# Patient Record
Sex: FEMALE | Race: WHITE | NOT HISPANIC OR LATINO | Employment: OTHER | RURAL
[De-identification: names, ages, dates, MRNs, and addresses within clinical notes are randomized per-mention and may not be internally consistent; named-entity substitution may affect disease eponyms.]

---

## 2022-06-21 ENCOUNTER — OFFICE VISIT (OUTPATIENT)
Dept: FAMILY MEDICINE CLINIC | Facility: CLINIC | Age: 47
End: 2022-06-21

## 2022-06-21 VITALS
OXYGEN SATURATION: 99 % | BODY MASS INDEX: 38.57 KG/M2 | HEIGHT: 66 IN | DIASTOLIC BLOOD PRESSURE: 90 MMHG | SYSTOLIC BLOOD PRESSURE: 140 MMHG | WEIGHT: 240 LBS | TEMPERATURE: 99.1 F | HEART RATE: 75 BPM

## 2022-06-21 DIAGNOSIS — M25.572 ACUTE LEFT ANKLE PAIN: ICD-10-CM

## 2022-06-21 DIAGNOSIS — L03.116 CELLULITIS OF LEFT ANKLE: Primary | ICD-10-CM

## 2022-06-21 PROCEDURE — 99213 OFFICE O/P EST LOW 20 MIN: CPT | Performed by: PHYSICIAN ASSISTANT

## 2022-06-21 RX ORDER — LAMOTRIGINE 200 MG/1
200 TABLET ORAL DAILY
COMMUNITY
Start: 2022-06-14

## 2022-06-21 RX ORDER — CEPHALEXIN 500 MG/1
500 CAPSULE ORAL 3 TIMES DAILY
Qty: 30 CAPSULE | Refills: 0 | Status: SHIPPED | OUTPATIENT
Start: 2022-06-21 | End: 2022-07-01

## 2022-06-21 RX ORDER — AMLODIPINE BESYLATE 5 MG/1
5 TABLET ORAL DAILY
COMMUNITY
Start: 2022-04-19

## 2022-06-21 RX ORDER — LISINOPRIL 40 MG/1
40 TABLET ORAL DAILY
COMMUNITY
Start: 2022-06-17

## 2022-06-21 RX ORDER — HYDROCODONE BITARTRATE AND ACETAMINOPHEN 7.5; 325 MG/1; MG/1
1 TABLET ORAL EVERY 6 HOURS
COMMUNITY
Start: 2022-06-06

## 2022-06-21 NOTE — PROGRESS NOTES
"Chief Complaint  Wound Check    Subjective          Shital Curtis presents to [facility]  Patient in today for evaluation on injury to left ankle. She was out with her dog and he took off running and his cable type lead caught the side and back of her left ankle area.  She cleaned it well and used some neosporin but started to noticed some yellow scabbed areas with continued soreness and swelling around area. No fever. No vomiting or diarrhea. Is diabetic but not on medication- states avg around 150+--she states has been scheduled through other provider office  to get in for her fasting labs check regarding.     Wound Check  There has been no drainage from the wound.       Objective   Vital Signs:   /90   Pulse 75   Temp 99.1 °F (37.3 °C) (Oral)   Ht 167.6 cm (66\")   Wt 109 kg (240 lb)   SpO2 99%   BMI 38.74 kg/m²     Body mass index is 38.74 kg/m².    Review of Systems   Constitutional: Negative for fever.   Musculoskeletal:        Soreness left ankle.    Skin: Positive for wound.       Past History:  Medical History: has a past medical history of Asthma, Benign essential hypertension, Bipolar disorder (HCC), Cholelithiases, Depression, Hemorrhoids, Migraines, Mixed hyperlipidemia, and Obesity.   Surgical History: has a past surgical history that includes LASIK (2000); Cholecystectomy (2001); and Back surgery.   Family History: family history includes Depression in her sister; Hyperlipidemia in her father; Hypertension in her mother; Lung cancer in an other family member; Migraines in her sister; Obesity in her father, mother, and sister; Other in her sister.   Social History: reports that she has never smoked. She has never used smokeless tobacco.      Current Outpatient Medications:   •  amLODIPine (NORVASC) 5 MG tablet, Take 5 mg by mouth Daily., Disp: , Rfl:   •  HYDROcodone-acetaminophen (NORCO) 7.5-325 MG per tablet, Take 1 tablet by mouth Every 6 (Six) Hours., Disp: " , Rfl:   •  lamoTRIgine (LaMICtal) 200 MG tablet, Take 200 mg by mouth Daily., Disp: , Rfl:   •  lisinopril (PRINIVIL,ZESTRIL) 40 MG tablet, Take 40 mg by mouth Daily., Disp: , Rfl:   •  metoprolol tartrate (LOPRESSOR) 25 MG tablet, Take 25 mg by mouth 2 (Two) Times a Day With Meals., Disp: , Rfl:   •  cephalexin (Keflex) 500 MG capsule, Take 1 capsule by mouth 3 (Three) Times a Day for 10 days., Disp: 30 capsule, Rfl: 0  Allergies: Morphine and Penicillins    Physical Exam  Constitutional:       Appearance: Normal appearance.   Pulmonary:      Effort: Pulmonary effort is normal.      Breath sounds: Normal breath sounds.   Skin:     Comments: Linear type yellow scabbing area with mild redness to side of left ankle; no streaking of redness noted- minimal warmth but no redness noted; FROM of ankle but soreness noted.    Neurological:      Mental Status: She is alert.             Assessment and Plan   Diagnoses and all orders for this visit:    1. Cellulitis of left ankle (Primary)  Will start on keflex. Keep area clean and dry.  Monitor area closely over next 24-48 hrs to make sure improving- for any progression recommend ER evaluation. Also strongly encouraged patient to keep her f/up on labs and medication mgt for chronic conditions.   2. Acute left ankle pain  -     XR Ankle 3+ View Left (In Office)  Will check xray today.   Other orders  -     cephalexin (Keflex) 500 MG capsule; Take 1 capsule by mouth 3 (Three) Times a Day for 10 days.  Dispense: 30 capsule; Refill: 0            Follow Up   Return if symptoms worsen or fail to improve.  Patient was given instructions and counseling regarding her condition or for health maintenance advice. Please see specific information pulled into the AVS if appropriate.     Carolee Garsia PA-C

## 2022-06-22 ENCOUNTER — TELEPHONE (OUTPATIENT)
Dept: FAMILY MEDICINE CLINIC | Facility: CLINIC | Age: 47
End: 2022-06-22

## 2022-06-22 NOTE — TELEPHONE ENCOUNTER
Pt recvd a notification on her MY [employer] that her xray results were in but it wouldn't load them. She wants you to message her or call her with the results

## 2023-05-15 ENCOUNTER — OFFICE VISIT (OUTPATIENT)
Dept: FAMILY MEDICINE CLINIC | Facility: CLINIC | Age: 48
End: 2023-05-15
Payer: MEDICARE

## 2023-05-15 VITALS
HEART RATE: 77 BPM | OXYGEN SATURATION: 99 % | DIASTOLIC BLOOD PRESSURE: 70 MMHG | SYSTOLIC BLOOD PRESSURE: 130 MMHG | BODY MASS INDEX: 39.37 KG/M2 | HEIGHT: 66 IN | TEMPERATURE: 98.2 F | WEIGHT: 245 LBS

## 2023-05-15 DIAGNOSIS — J30.9 ALLERGIC RHINITIS, UNSPECIFIED SEASONALITY, UNSPECIFIED TRIGGER: Primary | ICD-10-CM

## 2023-05-15 DIAGNOSIS — R19.7 DIARRHEA, UNSPECIFIED TYPE: ICD-10-CM

## 2023-05-15 PROCEDURE — 99213 OFFICE O/P EST LOW 20 MIN: CPT | Performed by: PHYSICIAN ASSISTANT

## 2023-05-15 RX ORDER — FLUTICASONE PROPIONATE 50 MCG
2 SPRAY, SUSPENSION (ML) NASAL DAILY
COMMUNITY
End: 2023-05-15 | Stop reason: SDUPTHER

## 2023-05-15 RX ORDER — FERROUS SULFATE 325(65) MG
TABLET ORAL
COMMUNITY
Start: 2000-05-15

## 2023-05-15 RX ORDER — FLUTICASONE PROPIONATE 50 MCG
2 SPRAY, SUSPENSION (ML) NASAL DAILY
Qty: 16 G | Refills: 1 | Status: SHIPPED | OUTPATIENT
Start: 2023-05-15

## 2023-05-15 RX ORDER — METHOCARBAMOL 500 MG/1
1 TABLET, FILM COATED ORAL DAILY PRN
COMMUNITY
Start: 2023-04-24

## 2023-05-15 RX ORDER — FLUTICASONE FUROATE 27.5 UG/1
SPRAY, METERED NASAL
COMMUNITY
Start: 2022-04-08 | End: 2023-05-15

## 2023-05-15 RX ORDER — LORATADINE 10 MG/1
TABLET ORAL
COMMUNITY
Start: 2022-04-08

## 2023-05-15 NOTE — PROGRESS NOTES
"Chief Complaint  Diarrhea (Had diarrhea for 2 weeks and pain below belly button but has since gone away in past 2 days and feeling fine. ) and Allergies    Subjective          Shital Curtis presents to [facility]  History of Present Illness  Patient in  today to get a refill on Flonase that she uses for her allergy symptoms throughout the year.  She also is taking Claritin and states that has helped.  She states when made the appointment she had been having diarrhea for a total of 2 weeks but that has since stopped.  This stopped 2 days ago.  She denies any abdominal pain at this time- previously was having to lower abdomen.  She states she is back to normal eating and drinking.  States urinating normal.  States she does have some hemorrhoids and throughout diarrhea had some mild bright red blood noted.  She states had a colonoscopy around 20 years ago.  Denies any fever. No vomiting .   Diarrhea   This is a new problem. The current episode started 1 to 4 weeks ago. The problem has been resolved. Pertinent negatives include no abdominal pain, arthralgias, fever, myalgias, vomiting or weight loss.   Allergies  This is a chronic problem. Associated symptoms include congestion. Pertinent negatives include no abdominal pain, arthralgias, chest pain, fever, myalgias, nausea or vomiting.       Objective   Vital Signs:   /70 (BP Location: Left arm, Patient Position: Sitting, Cuff Size: Large Adult)   Pulse 77   Temp 98.2 °F (36.8 °C)   Ht 167.6 cm (66\")   Wt 111 kg (245 lb)   SpO2 99%   BMI 39.54 kg/m²     Body mass index is 39.54 kg/m².    Review of Systems   Constitutional: Negative for fever and unexpected weight loss.   HENT: Positive for congestion and sneezing.    Respiratory: Negative for shortness of breath.    Cardiovascular: Negative for chest pain.   Gastrointestinal: Negative for abdominal pain, constipation, diarrhea, nausea and vomiting.   Genitourinary: Negative for " dysuria, frequency and hematuria.   Musculoskeletal: Negative for arthralgias and myalgias.   Neurological: Negative for dizziness and headache.       Past History:  Medical History: has a past medical history of Asthma, Benign essential hypertension, Bipolar disorder, Cholelithiases, Depression, Hemorrhoids, Migraines, Mixed hyperlipidemia, and Obesity.   Surgical History: has a past surgical history that includes LASIK (2000); Cholecystectomy (2001); and Back surgery.   Family History: family history includes Depression in her sister; Hyperlipidemia in her father; Hypertension in her mother; Lung cancer in an other family member; Migraines in her sister; Obesity in her father, mother, and sister; Other in her sister.   Social History: reports that she quit smoking about 13 years ago. Her smoking use included cigarettes. She has never used smokeless tobacco. She reports that she does not drink alcohol and does not use drugs.      Current Outpatient Medications:   •  amLODIPine (NORVASC) 5 MG tablet, Take 1 tablet by mouth Daily., Disp: , Rfl:   •  ferrous sulfate 325 (65 FE) MG tablet, , Disp: , Rfl:   •  fluticasone (FLONASE) 50 MCG/ACT nasal spray, 2 sprays into the nostril(s) as directed by provider Daily., Disp: 16 g, Rfl: 1  •  HYDROcodone-acetaminophen (NORCO) 7.5-325 MG per tablet, Take 1 tablet by mouth Every 6 (Six) Hours., Disp: , Rfl:   •  lamoTRIgine (LaMICtal) 200 MG tablet, Take 1 tablet by mouth Daily., Disp: , Rfl:   •  lisinopril (PRINIVIL,ZESTRIL) 40 MG tablet, Take 1 tablet by mouth Daily., Disp: , Rfl:   •  loratadine (CLARITIN) 10 MG tablet, , Disp: , Rfl:   •  methocarbamol (ROBAXIN) 500 MG tablet, Take 1 tablet by mouth Daily As Needed., Disp: , Rfl:   •  metoprolol tartrate (LOPRESSOR) 25 MG tablet, Take 1 tablet by mouth 2 (Two) Times a Day With Meals., Disp: , Rfl:   Allergies: Morphine and Penicillins    Physical Exam  Constitutional:       Appearance: Normal appearance.   HENT:       Right Ear: Tympanic membrane normal.      Left Ear: Tympanic membrane normal.      Mouth/Throat:      Mouth: Mucous membranes are moist.   Eyes:      Conjunctiva/sclera: Conjunctivae normal.      Pupils: Pupils are equal, round, and reactive to light.   Cardiovascular:      Rate and Rhythm: Normal rate and regular rhythm.      Heart sounds: Normal heart sounds.   Pulmonary:      Effort: Pulmonary effort is normal.      Breath sounds: Normal breath sounds.   Abdominal:      Palpations: Abdomen is soft.      Tenderness: There is no abdominal tenderness. There is no guarding or rebound.   Neurological:      Mental Status: She is alert and oriented to person, place, and time.   Psychiatric:         Mood and Affect: Mood normal.         Behavior: Behavior normal.             Assessment and Plan   Diagnoses and all orders for this visit:    1. Allergic rhinitis, unspecified seasonality, unspecified trigger (Primary)  Refilled flonase to use prn. RTC prn.   2. Diarrhea, unspecified type  Symptoms of diarrhea and abd pain  have resolved. Encouraged good hydration and monitor for any return of symptoms. She is due for screening colonoscopy and she states may consider and will call back when wants to get scheduled.   Other orders  -     fluticasone (FLONASE) 50 MCG/ACT nasal spray; 2 sprays into the nostril(s) as directed by provider Daily.  Dispense: 16 g; Refill: 1            Follow Up   No follow-ups on file.  Patient was given instructions and counseling regarding her condition or for health maintenance advice. Please see specific information pulled into the AVS if appropriate.     Carolee Garsia PA-C

## 2023-05-16 RX ORDER — FLUTICASONE PROPIONATE 50 MCG
SPRAY, SUSPENSION (ML) NASAL
Qty: 48 G | OUTPATIENT
Start: 2023-05-16

## 2023-05-17 ENCOUNTER — TELEPHONE (OUTPATIENT)
Dept: FAMILY MEDICINE CLINIC | Facility: CLINIC | Age: 48
End: 2023-05-17
Payer: MEDICARE

## 2023-05-17 RX ORDER — GEMFIBROZIL 600 MG/1
600 TABLET, FILM COATED ORAL
COMMUNITY

## 2023-05-17 NOTE — TELEPHONE ENCOUNTER
Pt contacted about labs she said they put her on lopid 600mg 2 x a day. Pt said they told her it will also help with sugars.

## 2023-05-23 RX ORDER — LANCETS 30 GAUGE
EACH MISCELLANEOUS
Qty: 100 EACH | Refills: 3 | Status: SHIPPED | OUTPATIENT
Start: 2023-05-23

## 2024-03-26 ENCOUNTER — TELEPHONE (OUTPATIENT)
Dept: FAMILY MEDICINE CLINIC | Facility: CLINIC | Age: 49
End: 2024-03-26
Payer: MEDICARE

## 2024-03-26 NOTE — TELEPHONE ENCOUNTER
"Spoke with pt regarding scheduling her medicare wellness.   Patient stated she would call back to schedule this appt.   Hub relay: \"it looks like you are due for your medicare wellness exam with pcp.\" Please schedule this appt. Thank you!  "

## 2025-02-20 ENCOUNTER — OFFICE VISIT (OUTPATIENT)
Dept: FAMILY MEDICINE CLINIC | Facility: CLINIC | Age: 50
End: 2025-02-20
Payer: MEDICARE

## 2025-02-20 VITALS
TEMPERATURE: 99.6 F | WEIGHT: 229.8 LBS | BODY MASS INDEX: 36.93 KG/M2 | SYSTOLIC BLOOD PRESSURE: 134 MMHG | HEIGHT: 66 IN | OXYGEN SATURATION: 96 % | HEART RATE: 86 BPM | DIASTOLIC BLOOD PRESSURE: 80 MMHG

## 2025-02-20 DIAGNOSIS — J40 BRONCHITIS: Primary | ICD-10-CM

## 2025-02-20 PROCEDURE — 1126F AMNT PAIN NOTED NONE PRSNT: CPT | Performed by: PHYSICIAN ASSISTANT

## 2025-02-20 PROCEDURE — 99213 OFFICE O/P EST LOW 20 MIN: CPT | Performed by: PHYSICIAN ASSISTANT

## 2025-02-20 RX ORDER — DOXYCYCLINE 100 MG/1
100 CAPSULE ORAL 2 TIMES DAILY
Qty: 14 CAPSULE | Refills: 0 | Status: SHIPPED | OUTPATIENT
Start: 2025-02-20

## 2025-02-21 NOTE — PROGRESS NOTES
"Chief Complaint  Cough (Last Tuesday had flu and still had the cough since.)    Subjective          Shital Curtis presents to [facility]  History of Present Illness  Patient  in  today for evaluation on persistent cough and congestion.  She states she was diagnosed with the flu 9 days ago.  States had fever in the beginning and then it went away, but has noticed it starting back up in the last couple of days.  States cough has been mostly dry.  Denies wheezing.  Denies nausea, vomiting, or diarrhea.  Cough  This is a new problem. The current episode started 1 to 4 weeks ago. Associated symptoms include chills, a fever, headaches and nasal congestion. Pertinent negatives include no chest pain, ear pain, sore throat, shortness of breath or wheezing.       Objective   Vital Signs:   /80 (BP Location: Left arm, Patient Position: Sitting, Cuff Size: Large Adult)   Pulse 86   Temp 99.6 °F (37.6 °C) (Oral)   Ht 167.6 cm (66\")   Wt 104 kg (229 lb 12.8 oz)   SpO2 96%   BMI 37.09 kg/m²     Body mass index is 37.09 kg/m².    Review of Systems   Constitutional:  Positive for chills and fever.   HENT:  Negative for ear pain and sore throat.    Respiratory:  Positive for cough. Negative for shortness of breath and wheezing.    Cardiovascular:  Negative for chest pain.   Gastrointestinal:  Negative for diarrhea, nausea and vomiting.   Genitourinary:  Negative for dysuria and frequency.   Neurological:  Negative for dizziness and headache.       Past History:  Medical History: has a past medical history of Asthma, Benign essential hypertension, Bipolar disorder, Cholelithiases, Depression, Hemorrhoids, Migraines, Mixed hyperlipidemia, and Obesity.   Surgical History: has a past surgical history that includes LASIK (2000); Cholecystectomy (2001); and Back surgery.   Family History: family history includes Depression in her sister; Hyperlipidemia in her father; Hypertension in her mother; " Lung cancer in an other family member; Migraines in her sister; Obesity in her father, mother, and sister; Other in her sister.   Social History: reports that she quit smoking about 15 years ago. Her smoking use included cigarettes. She has never used smokeless tobacco. She reports that she does not drink alcohol and does not use drugs.      Current Outpatient Medications:     amLODIPine (NORVASC) 5 MG tablet, Take 1 tablet by mouth Daily., Disp: , Rfl:     ferrous sulfate 325 (65 FE) MG tablet, , Disp: , Rfl:     fluticasone (FLONASE) 50 MCG/ACT nasal spray, 2 sprays into the nostril(s) as directed by provider Daily., Disp: 16 g, Rfl: 1    gemfibrozil (LOPID) 600 MG tablet, Take 1 tablet by mouth 2 (Two) Times a Day Before Meals., Disp: , Rfl:     glucose blood test strip, Check FSBS BID, Disp: 100 each, Rfl: 3    lamoTRIgine (LaMICtal) 200 MG tablet, Take 1 tablet by mouth Daily., Disp: , Rfl:     Lancets misc, Check FSBS BID, Disp: 100 each, Rfl: 3    lisinopril (PRINIVIL,ZESTRIL) 40 MG tablet, Take 1 tablet by mouth Daily., Disp: , Rfl:     loratadine (CLARITIN) 10 MG tablet, , Disp: , Rfl:     metoprolol tartrate (LOPRESSOR) 25 MG tablet, Take 1 tablet by mouth 2 (Two) Times a Day With Meals., Disp: , Rfl:     doxycycline (VIBRAMYCIN) 100 MG capsule, Take 1 capsule by mouth 2 (Two) Times a Day., Disp: 14 capsule, Rfl: 0  Allergies: Morphine and Penicillins    Physical Exam  Constitutional:       Appearance: Normal appearance.   HENT:      Right Ear: Tympanic membrane normal.      Left Ear: Tympanic membrane normal.      Mouth/Throat:      Pharynx: Oropharynx is clear.   Eyes:      Conjunctiva/sclera: Conjunctivae normal.      Pupils: Pupils are equal, round, and reactive to light.   Cardiovascular:      Rate and Rhythm: Normal rate and regular rhythm.      Heart sounds: Normal heart sounds.   Pulmonary:      Effort: Pulmonary effort is normal.      Breath sounds: Normal breath sounds.   Neurological:       Mental Status: She is oriented to person, place, and time.   Psychiatric:         Mood and Affect: Mood normal.         Behavior: Behavior normal.             Assessment and Plan   Diagnoses and all orders for this visit:    1. Bronchitis (Primary)  With persistent cough/congestion- will start on antibiotic- discussed possible side effects ; recommend expectorant along with good hydration and rest; rtc if symptoms not improving       Also discussed with patient appears is due for physical exam, colon cancer screening, fasting labs, gyn exam and mammogram- patient states will consider to schedule.     Other orders  -     doxycycline (VIBRAMYCIN) 100 MG capsule; Take 1 capsule by mouth 2 (Two) Times a Day.  Dispense: 14 capsule; Refill: 0  -     glucose blood test strip; Check FSBS BID  Dispense: 100 each; Refill: 3            Follow Up   No follow-ups on file.  Patient was given instructions and counseling regarding her condition or for health maintenance advice. Please see specific information pulled into the AVS if appropriate.     Carolee Garsia PA-C

## 2025-04-01 ENCOUNTER — TELEPHONE (OUTPATIENT)
Dept: FAMILY MEDICINE CLINIC | Facility: CLINIC | Age: 50
End: 2025-04-01

## 2025-04-01 NOTE — TELEPHONE ENCOUNTER
"  Caller: Shital Curtis \"Shital Curtis\"    Relationship: Self    Best call back number: [phone]    What is the best time to reach you: ANYTIME     Who are you requesting to speak with (clinical staff, provider,  specific staff member): CLINICAL STAFF     PATIENT STATES SHE STILL HASN'T BEEN ABLE TO GET HER TEST STRIPS FILLED THROUGH PHARMACY. PHARMACY TOLD OFFICE WAS NEEDING TO FILL OUT A SWO FORM IN ORDER TO GET THEM   "

## 2025-04-11 NOTE — TELEPHONE ENCOUNTER
Attempted to call [facility] and was put on hold for 12 min. Will call back to see what they need.

## 2025-04-14 ENCOUNTER — PATIENT MESSAGE (OUTPATIENT)
Dept: FAMILY MEDICINE CLINIC | Facility: CLINIC | Age: 50
End: 2025-04-14
Payer: MEDICARE

## 2025-04-14 NOTE — TELEPHONE ENCOUNTER
Spoke with [facility]. This is being billed through Medicare Part B and needs a medicare form filled out.  The pharmacist will contact the medicare dept and have them fax the form to our office to fill out.       Pt informed via [employer].

## 2025-04-22 NOTE — TELEPHONE ENCOUNTER
Caller: [id number] - [place], [place] - [address] AT [address]  & [address] - [phone]  - [phone] FX    Relationship to patient: Pharmacy    Best call back number: [phone]     Patient is needing: PATIENT'S PHARMACY CALLED TO INFORM US THAT THEY WERE NOTIFIED BY THE PATIENT'S INSURANCE PLAN THAT THEY ARE STILL NEEDING #3 AND #7 FILLED OUT ON THE FORM    PLEASE ADVISE

## 2025-05-22 ENCOUNTER — TELEPHONE (OUTPATIENT)
Dept: FAMILY MEDICINE CLINIC | Facility: CLINIC | Age: 50
End: 2025-05-22
Payer: MEDICARE

## 2025-05-22 NOTE — TELEPHONE ENCOUNTER
"  Caller: Shital Curtis \"Shital Navarroton\"    Relationship: Self    Best call back number: [phone]     What form or medical record are you requesting: [facility] FORM    Who is requesting this form or medical record from you: MEDICARE    How would you like to receive the form or medical records (pick-up, mail, fax): FAX  If fax, what is the fax number: ON FORM    Timeframe paperwork needed: ASAP    Additional notes: PT CALLED PHARMACY AND ALINA'S REP STATED THE THE [facility] PAPERWORK FOR THE GLUCOSE TEST STRIPS NEEDS TO BE COMPLETE. OFFICE DID NOT ANSWER QUESTIONS 3 AND 6. PLEASE RE-SUBMIT FULLY COMPLETED FORM FOR THE PT AND FAX BACK.  "

## 2025-06-02 NOTE — TELEPHONE ENCOUNTER
Called pt let her know the form was faxed 4 times. Pt aware said she will call insurance company.

## 2025-06-02 NOTE — TELEPHONE ENCOUNTER
PT CALLED STATING THE INSURANCE COMPANY STILL HAS NOT RECEIVED THE CORRECTED PAPERWORK. PLEASE SEND THE CORRECTED PAPERWORK ASAP TO THE INSURANCE COMPANY SO PT CAN GET THE TEST STRIPS. [phone]

## 2025-06-11 ENCOUNTER — TELEPHONE (OUTPATIENT)
Dept: FAMILY MEDICINE CLINIC | Facility: CLINIC | Age: 50
End: 2025-06-11

## 2025-06-12 ENCOUNTER — OFFICE VISIT (OUTPATIENT)
Dept: FAMILY MEDICINE CLINIC | Facility: CLINIC | Age: 50
End: 2025-06-12
Payer: MEDICARE

## 2025-06-12 VITALS
WEIGHT: 223 LBS | BODY MASS INDEX: 35.84 KG/M2 | SYSTOLIC BLOOD PRESSURE: 120 MMHG | HEIGHT: 66 IN | HEART RATE: 97 BPM | OXYGEN SATURATION: 96 % | DIASTOLIC BLOOD PRESSURE: 82 MMHG

## 2025-06-12 DIAGNOSIS — E78.1 HYPERTRIGLYCERIDEMIA: ICD-10-CM

## 2025-06-12 DIAGNOSIS — Z12.31 ENCOUNTER FOR SCREENING MAMMOGRAM FOR MALIGNANT NEOPLASM OF BREAST: ICD-10-CM

## 2025-06-12 DIAGNOSIS — Z11.59 ENCOUNTER FOR HEPATITIS C SCREENING TEST FOR LOW RISK PATIENT: ICD-10-CM

## 2025-06-12 DIAGNOSIS — E11.65 TYPE 2 DIABETES MELLITUS WITH HYPERGLYCEMIA, WITHOUT LONG-TERM CURRENT USE OF INSULIN: Primary | ICD-10-CM

## 2025-06-12 RX ORDER — AVOBENZONE, HOMOSALATE, OCTISALATE, OCTOCRYLENE 30; 40; 45; 26 MG/ML; MG/ML; MG/ML; MG/ML
CREAM TOPICAL
Qty: 100 EACH | Refills: 3 | Status: SHIPPED | OUTPATIENT
Start: 2025-06-12

## 2025-06-12 NOTE — PROGRESS NOTES
Chief Complaint  Hyperglycemia (Follow up on blood work from cardiology )    Subjective          Shital Curtis presents to [facility]  History of Present Illness  Patient today to follow-up on recent labs that she had drawn through her cardiology office.  It showed extensive elevation to triglycerides at 2015 for which she states she has been on gemfibrozil for several years.  She states she was diagnosed with high triglycerides as a teenager.  Her day of testing sugar that was fasting was at 348.  They had not drawn a hemoglobin A1c.  She has a history of diabetes but has not been on medication.  She states she had gestational diabetes with her daughter who is now a teenager.  Family history of diabetes in both parents. She states blood pressure has been doing well. Denies chest pain or shortness of breath. Since finding out recent lab results from last week - she has been monitoring her fasting sugar level and was at 299 this am. She plans to work on lower carb diet and trying to get some exercise. She is due for eye exam and plans to get scheduled. She sees behavioral health for her bipolar medications.   Hyperglycemia  Symptoms are chronic.   Symptoms include fatigue and headaches.    Pertinent negative symptoms include no abdominal pain, no anorexia, no joint pain, no change in stool, no chest pain, no chills, no congestion, no cough, no diaphoresis, no fever, no joint swelling, no myalgias, no nausea, no neck pain, no numbness, no rash, no sore throat, no swollen glands, no dysuria, no vertigo, no visual change, no vomiting and no weakness.     Symptoms are: recurrent.   Onset was at an unknown time.   Symptoms occur: intermittently.  Symptoms include: fatigue and headaches.   Pertinent negative symptoms include no abdominal pain, no anorexia, no joint pain, no change in stool, no chest pain, no chills, no congestion, no cough, no diaphoresis, no fever, no joint swelling, no  "myalgias, no nausea, no neck pain, no numbness, no rash, no sore throat, no swollen glands, no dysuria, no vertigo, no visual change, no vomiting and no weakness.   Treatment and/or Medications comments include: Tylenol       Objective   Vital Signs:   /82   Pulse 97   Ht 167.6 cm (66\")   Wt 101 kg (223 lb)   SpO2 96%   BMI 35.99 kg/m²     Body mass index is 35.99 kg/m².    Review of Systems   Constitutional:  Positive for fatigue. Negative for chills, diaphoresis and fever.   HENT:  Negative for congestion, sore throat and swollen glands.    Respiratory:  Negative for cough and shortness of breath.    Cardiovascular:  Negative for chest pain.   Gastrointestinal:  Negative for abdominal pain, anorexia, blood in stool, diarrhea, nausea and vomiting.   Genitourinary:  Negative for dysuria and frequency.   Musculoskeletal:  Negative for joint pain, myalgias and neck pain.   Skin:  Negative for rash.   Neurological:  Negative for dizziness, vertigo, weakness, numbness and headache.   Psychiatric/Behavioral:  Positive for depressed mood. Negative for suicidal ideas.        Past History:  Medical History: has a past medical history of Asthma, Benign essential hypertension, Bipolar disorder, Cholelithiases, Depression, Hemorrhoids, Migraines, Mixed hyperlipidemia, and Obesity.   Surgical History: has a past surgical history that includes LASIK (2000); Cholecystectomy (2001); and Back surgery.   Family History: family history includes Depression in her sister; Hyperlipidemia in her father; Hypertension in her mother; Lung cancer in an other family member; Migraines in her sister; Obesity in her father, mother, and sister; Other in her sister.   Social History: reports that she quit smoking about 15 years ago. Her smoking use included cigarettes. She has never used smokeless tobacco. She reports that she does not drink alcohol and does not use drugs.      Current Outpatient Medications:     amLODIPine (NORVASC) 5 " MG tablet, Take 1 tablet by mouth Daily., Disp: , Rfl:     ferrous sulfate 325 (65 FE) MG tablet, , Disp: , Rfl:     fluticasone (FLONASE) 50 MCG/ACT nasal spray, 2 sprays into the nostril(s) as directed by provider Daily., Disp: 16 g, Rfl: 1    gemfibrozil (LOPID) 600 MG tablet, Take 1 tablet by mouth 2 (Two) Times a Day Before Meals., Disp: , Rfl:     glucose blood test strip, Check FSBS BID, Disp: 100 each, Rfl: 3    lamoTRIgine (LaMICtal) 200 MG tablet, Take 1 tablet by mouth Daily., Disp: , Rfl:     Lancets misc, Check FSBS BID, Disp: 100 each, Rfl: 3    lisinopril (PRINIVIL,ZESTRIL) 40 MG tablet, Take 1 tablet by mouth Daily., Disp: , Rfl:     loratadine (CLARITIN) 10 MG tablet, , Disp: , Rfl:     metoprolol tartrate (LOPRESSOR) 25 MG tablet, Take 1 tablet by mouth 2 (Two) Times a Day With Meals., Disp: , Rfl:     niacin (NIACIN SR,NIASPAN ER) 500 MG CR capsule, Take 1 capsule by mouth., Disp: , Rfl:   Allergies: Morphine and Penicillins    Physical Exam  Constitutional:       Appearance: Normal appearance.   HENT:      Right Ear: Tympanic membrane normal.      Left Ear: Tympanic membrane normal.      Mouth/Throat:      Pharynx: Oropharynx is clear.   Eyes:      Conjunctiva/sclera: Conjunctivae normal.      Pupils: Pupils are equal, round, and reactive to light.   Cardiovascular:      Rate and Rhythm: Normal rate and regular rhythm.      Heart sounds: Normal heart sounds.   Pulmonary:      Effort: Pulmonary effort is normal.      Breath sounds: Normal breath sounds.   Abdominal:      Palpations: Abdomen is soft.      Tenderness: There is no abdominal tenderness.   Neurological:      Mental Status: She is oriented to person, place, and time.   Psychiatric:         Mood and Affect: Mood normal.         Behavior: Behavior normal.             Assessment and Plan   Diagnoses and all orders for this visit:    1. Type 2 diabetes mellitus with hyperglycemia, without long-term current use of insulin (Primary)  -      Hemoglobin A1c  Will check hga1c today and discussed starting on metformin - discussed possible side effects, monitoring her sugar levels and rechecking in one month with her glucometer; with her having elevated triglycerides as such, would recommend to also put in referral for endocrinology for further f/up and she would like to get that scheduled  2. Hypertriglyceridemia  Keep f/up with cardiology - states niacin was added to her medication   3. Encounter for hepatitis C screening test for low risk patient  -     Hepatitis C Antibody    4. Encounter for screening mammogram for malignant neoplasm of breast  -     Cancel: Mammo Screening Bilateral With CAD; Future  -     Mammo Screening Bilateral With CAD; Future  Will get scheduled for mammogram and encourage patient to get scheduled for gyn exam and colon cancer screening- declines us getting those scheduled today           Follow Up   No follow-ups on file.  Patient was given instructions and counseling regarding her condition or for health maintenance advice. Please see specific information pulled into the AVS if appropriate.     Carolee Garsia PA-C

## 2025-06-13 ENCOUNTER — RESULTS FOLLOW-UP (OUTPATIENT)
Dept: FAMILY MEDICINE CLINIC | Facility: CLINIC | Age: 50
End: 2025-06-13
Payer: MEDICARE

## 2025-06-13 LAB
HBA1C MFR BLD: 11.5 % (ref 4.8–5.6)
HCV IGG SERPL QL IA: NON REACTIVE

## 2025-06-13 RX ORDER — METFORMIN HYDROCHLORIDE 500 MG/1
TABLET, EXTENDED RELEASE ORAL
Qty: 90 TABLET | OUTPATIENT
Start: 2025-06-13

## 2025-06-13 RX ORDER — METFORMIN HYDROCHLORIDE 500 MG/1
TABLET, EXTENDED RELEASE ORAL
Qty: 30 TABLET | Refills: 0 | Status: SHIPPED | OUTPATIENT
Start: 2025-06-13

## 2025-06-19 DIAGNOSIS — Z12.31 ENCOUNTER FOR SCREENING MAMMOGRAM FOR MALIGNANT NEOPLASM OF BREAST: Primary | ICD-10-CM

## 2025-07-08 ENCOUNTER — OFFICE VISIT (OUTPATIENT)
Dept: FAMILY MEDICINE CLINIC | Facility: CLINIC | Age: 50
End: 2025-07-08
Payer: MEDICARE

## 2025-07-08 VITALS
HEIGHT: 66 IN | DIASTOLIC BLOOD PRESSURE: 80 MMHG | SYSTOLIC BLOOD PRESSURE: 116 MMHG | OXYGEN SATURATION: 98 % | WEIGHT: 221 LBS | BODY MASS INDEX: 35.52 KG/M2 | HEART RATE: 79 BPM

## 2025-07-08 DIAGNOSIS — I10 HYPERTENSION, ESSENTIAL: ICD-10-CM

## 2025-07-08 DIAGNOSIS — E78.2 HYPERLIPIDEMIA, MIXED: ICD-10-CM

## 2025-07-08 DIAGNOSIS — E11.65 TYPE 2 DIABETES MELLITUS WITH HYPERGLYCEMIA, WITHOUT LONG-TERM CURRENT USE OF INSULIN: Primary | ICD-10-CM

## 2025-07-08 PROCEDURE — G2211 COMPLEX E/M VISIT ADD ON: HCPCS | Performed by: PHYSICIAN ASSISTANT

## 2025-07-08 PROCEDURE — 3046F HEMOGLOBIN A1C LEVEL >9.0%: CPT | Performed by: PHYSICIAN ASSISTANT

## 2025-07-08 PROCEDURE — 1126F AMNT PAIN NOTED NONE PRSNT: CPT | Performed by: PHYSICIAN ASSISTANT

## 2025-07-08 PROCEDURE — 99214 OFFICE O/P EST MOD 30 MIN: CPT | Performed by: PHYSICIAN ASSISTANT

## 2025-07-08 RX ORDER — METFORMIN HYDROCHLORIDE 500 MG/1
TABLET, EXTENDED RELEASE ORAL
Qty: 60 TABLET | Refills: 1 | Status: SHIPPED | OUTPATIENT
Start: 2025-07-08 | End: 2025-07-08

## 2025-07-08 RX ORDER — METFORMIN HYDROCHLORIDE 500 MG/1
500 TABLET, EXTENDED RELEASE ORAL 2 TIMES DAILY WITH MEALS
Qty: 180 TABLET | Refills: 0 | Status: SHIPPED | OUTPATIENT
Start: 2025-07-08

## 2025-07-08 NOTE — PROGRESS NOTES
"Chief Complaint  Diabetes (Med check on metformin )    Subjective          Shital Curtis presents to [facility]  History of Present Illness  Patient in today for follow up on diabetes. She has an appt with endocrinology next month. Her recent hga1c was at 11.5. She has been on metformin for past 3 weeks and has started monitoring her sugar levels. She states lowest reading at 284.  She is starting to notice certain things that she is eating that are affecting her levels. She has tried to cut back on bread, pasta and potatoes and work on portion sizes. She states is tolerating the metformin well. States over past year has noticed occasional episodes of blurry vision.   Diabetes  Diabetes type:  Type 2  MedicAlert ID: no    Associated symptoms:     blurred vision      no chest pain, no foot paresthesias, no foot ulcerations, no polydipsia, no polyuria and no weight loss    Symptom course:  Stable  Hypoglycemia symptoms:     no confusion, no headaches, no speech difficulty, no sweats and no tremors    Hypoglycemia complications:     no blackouts, no hospitalization, no nocturnal hypoglycemia, no required assistance and no required glucagon injection    Treatment compliance:  All of the time  Monitoring regimen:     Home blood tests:  1-2 x per day  Highest range:  >200  Current diet:  Diabetic, high fat/cholesterol and high fiber  Meal planning:  Carbohydrate counting, avoidance of concentrated sweets and low carbohydrate diet  Exercise:  Rarely  Eye exam current: no    Sees podiatrist: no        Objective   Vital Signs:   /80   Pulse 79   Ht 167.6 cm (66\")   Wt 100 kg (221 lb)   SpO2 98%   BMI 35.67 kg/m²     Body mass index is 35.67 kg/m².    Review of Systems   Constitutional:  Negative for fever and unexpected weight loss.   HENT:  Negative for sore throat.    Eyes:  Positive for blurred vision.   Respiratory:  Negative for cough and shortness of breath.    Cardiovascular:  " Negative for chest pain.   Gastrointestinal:  Negative for abdominal pain, diarrhea, nausea and vomiting.   Endocrine: Negative for polydipsia and polyuria.   Neurological:  Negative for tremors, speech difficulty, headache and confusion.       Past History:  Medical History: has a past medical history of Asthma, Benign essential hypertension, Bipolar disorder, Cholelithiases, Depression, Hemorrhoids, Migraines, Mixed hyperlipidemia, and Obesity.   Surgical History: has a past surgical history that includes LASIK (2000); Cholecystectomy (2001); and Back surgery.   Family History: family history includes Depression in her sister; Hyperlipidemia in her father; Hypertension in her mother; Lung cancer in an other family member; Migraines in her sister; Obesity in her father, mother, and sister; Other in her sister.   Social History: reports that she quit smoking about 15 years ago. Her smoking use included cigarettes. She has never used smokeless tobacco. She reports that she does not drink alcohol and does not use drugs.      Current Outpatient Medications:     amLODIPine (NORVASC) 5 MG tablet, Take 1 tablet by mouth Daily., Disp: , Rfl:     ferrous sulfate 325 (65 FE) MG tablet, , Disp: , Rfl:     fluticasone (FLONASE) 50 MCG/ACT nasal spray, 2 sprays into the nostril(s) as directed by provider Daily., Disp: 16 g, Rfl: 1    gemfibrozil (LOPID) 600 MG tablet, Take 1 tablet by mouth 2 (Two) Times a Day Before Meals., Disp: , Rfl:     glucose blood test strip, Check FSBS BID, Disp: 100 each, Rfl: 3    lamoTRIgine (LaMICtal) 200 MG tablet, Take 1 tablet by mouth Daily., Disp: , Rfl:     Lancets misc, Check FSBS BID, Disp: 100 each, Rfl: 3    lisinopril (PRINIVIL,ZESTRIL) 40 MG tablet, Take 1 tablet by mouth Daily., Disp: , Rfl:     loratadine (CLARITIN) 10 MG tablet, , Disp: , Rfl:     metFORMIN ER (GLUCOPHAGE-XR) 500 MG 24 hr tablet, Take one tablet by oral route twice a day with meals, Disp: 60 tablet, Rfl: 1     metoprolol tartrate (LOPRESSOR) 25 MG tablet, Take 1 tablet by mouth 2 (Two) Times a Day With Meals., Disp: , Rfl:     niacin (NIACIN SR,NIASPAN ER) 500 MG CR capsule, Take 1 capsule by mouth., Disp: , Rfl:   Allergies: Morphine and Penicillins    Physical Exam  Constitutional:       Appearance: Normal appearance.   HENT:      Right Ear: Tympanic membrane normal.      Left Ear: Tympanic membrane normal.      Mouth/Throat:      Pharynx: Oropharynx is clear.   Eyes:      Conjunctiva/sclera: Conjunctivae normal.      Pupils: Pupils are equal, round, and reactive to light.   Cardiovascular:      Rate and Rhythm: Normal rate and regular rhythm.      Heart sounds: Normal heart sounds.   Pulmonary:      Effort: Pulmonary effort is normal.      Breath sounds: Normal breath sounds.   Neurological:      Mental Status: She is oriented to person, place, and time.   Psychiatric:         Mood and Affect: Mood normal.         Behavior: Behavior normal.             Assessment and Plan   Diagnoses and all orders for this visit:    1. Type 2 diabetes mellitus with hyperglycemia, without long-term current use of insulin (Primary)  Will increase metformin to twice a day and continue to work on healthy low carb diet and exercise and will f/up with endocrinology next month to discuss additional mgt; will also place referral for dietician; rtc prior to recheck if not continuing to notice improvements with sugar levels   2. Hypertension, essential  Continue medication and f/up with cardiology as directed.   3. Hyperlipidemia, mixed  Continue medication and f/up with cardiology as directed.   Other orders  -     metFORMIN ER (GLUCOPHAGE-XR) 500 MG 24 hr tablet; Take one tablet by oral route twice a day with meals  Dispense: 60 tablet; Refill: 1            Follow Up   No follow-ups on file.  Patient was given instructions and counseling regarding her condition or for health maintenance advice. Please see specific information pulled into the  AVS if appropriate.     Carolee Garsia PA-C

## 2025-08-15 ENCOUNTER — OFFICE VISIT (OUTPATIENT)
Dept: ENDOCRINOLOGY | Facility: CLINIC | Age: 50
End: 2025-08-15
Payer: MEDICARE

## 2025-08-15 VITALS
BODY MASS INDEX: 38.8 KG/M2 | DIASTOLIC BLOOD PRESSURE: 80 MMHG | WEIGHT: 219 LBS | SYSTOLIC BLOOD PRESSURE: 110 MMHG | HEART RATE: 75 BPM | OXYGEN SATURATION: 98 % | HEIGHT: 63 IN

## 2025-08-15 DIAGNOSIS — Z79.4 TYPE 2 DIABETES MELLITUS WITH HYPERGLYCEMIA, WITH LONG-TERM CURRENT USE OF INSULIN: Primary | ICD-10-CM

## 2025-08-15 DIAGNOSIS — E78.1 FAMILIAL HYPERTRIGLYCERIDEMIA: ICD-10-CM

## 2025-08-15 DIAGNOSIS — E11.65 TYPE 2 DIABETES MELLITUS WITH HYPERGLYCEMIA, WITH LONG-TERM CURRENT USE OF INSULIN: Primary | ICD-10-CM

## 2025-08-15 LAB
EXPIRATION DATE: ABNORMAL
GLUCOSE BLDC GLUCOMTR-MCNC: 283 MG/DL (ref 70–130)
Lab: ABNORMAL

## 2025-08-15 PROCEDURE — 99204 OFFICE O/P NEW MOD 45 MIN: CPT | Performed by: PHYSICIAN ASSISTANT

## 2025-08-15 PROCEDURE — 3046F HEMOGLOBIN A1C LEVEL >9.0%: CPT | Performed by: PHYSICIAN ASSISTANT

## 2025-08-15 PROCEDURE — 82947 ASSAY GLUCOSE BLOOD QUANT: CPT | Performed by: PHYSICIAN ASSISTANT

## 2025-08-15 RX ORDER — METFORMIN HYDROCHLORIDE 500 MG/1
1000 TABLET, EXTENDED RELEASE ORAL 2 TIMES DAILY WITH MEALS
Qty: 360 TABLET | Refills: 1 | Status: SHIPPED | OUTPATIENT
Start: 2025-08-15

## 2025-08-18 PROBLEM — E78.1 FAMILIAL HYPERTRIGLYCERIDEMIA: Status: ACTIVE | Noted: 2025-08-18

## 2025-08-18 PROBLEM — Z79.4 TYPE 2 DIABETES MELLITUS WITH HYPERGLYCEMIA, WITH LONG-TERM CURRENT USE OF INSULIN: Status: ACTIVE | Noted: 2025-08-18

## 2025-08-18 PROBLEM — E11.9 TYPE 2 DIABETES MELLITUS: Status: ACTIVE | Noted: 2025-08-18

## 2025-08-18 PROBLEM — E11.65 TYPE 2 DIABETES MELLITUS WITH HYPERGLYCEMIA, WITH LONG-TERM CURRENT USE OF INSULIN: Status: ACTIVE | Noted: 2025-08-18

## 2025-08-19 ENCOUNTER — TELEPHONE (OUTPATIENT)
Dept: ENDOCRINOLOGY | Facility: CLINIC | Age: 50
End: 2025-08-19
Payer: MEDICARE

## 2025-08-20 ENCOUNTER — TELEPHONE (OUTPATIENT)
Dept: ENDOCRINOLOGY | Facility: CLINIC | Age: 50
End: 2025-08-20
Payer: MEDICARE